# Patient Record
Sex: FEMALE | Race: WHITE | Employment: FULL TIME | URBAN - METROPOLITAN AREA
[De-identification: names, ages, dates, MRNs, and addresses within clinical notes are randomized per-mention and may not be internally consistent; named-entity substitution may affect disease eponyms.]

---

## 2022-06-29 ENCOUNTER — TRANSCRIBE ORDER (OUTPATIENT)
Dept: SCHEDULING | Age: 32
End: 2022-06-29

## 2022-06-29 DIAGNOSIS — N63.20 MASS OF LEFT BREAST: Primary | ICD-10-CM

## 2022-07-19 ENCOUNTER — HOSPITAL ENCOUNTER (OUTPATIENT)
Dept: MAMMOGRAPHY | Age: 32
Discharge: HOME | End: 2022-07-19
Attending: STUDENT IN AN ORGANIZED HEALTH CARE EDUCATION/TRAINING PROGRAM

## 2022-07-19 DIAGNOSIS — N63.20 MASS OF LEFT BREAST: ICD-10-CM

## 2022-10-03 ENCOUNTER — HOSPITAL ENCOUNTER (OUTPATIENT)
Dept: PERINATAL CARE | Age: 32
Discharge: HOME | End: 2022-10-03
Attending: OBSTETRICS & GYNECOLOGY

## 2022-10-04 NOTE — PROGRESS NOTES
MATERNAL FETAL MEDICINE      REQUESTED BY: Dr. Moses Gutierrez: 10/04/22    REASON FOR CONSULTATION: elevated anticardiolipin antibodies    Giuliana Sequeira is a 33 y.o. female      HPI  Wyatt Andrea had a full term pregnancy with no complications. She has tried to become pregnant and has had several miscarriages. Her first miscarriage was at approximately 10 weeks. She had seen a fetal heartbeat at a prior visit. Since then, she has had 3 miscarriages at approximately 6 weeks gestation. She has not had cytogenetics performed on products of conception. She had an evaluation for recurrent pregnancy loss several years ago. She is unsure of the findings. Wyatt Andrea had her most recent miscarriage in August. An evaluation after that miscarriage demonstrated an elevated anti-cardiolipin IgM. She reports that during her first pregnancy, she has a false positive RPR. She has not had a pelvic ultrasound performed. She reports that she has regular menses. No past medical history on file. Past Surgical History:   Procedure Laterality Date    HX APPENDECTOMY         OB History    Para Term  AB Living   5 1 1 0 4 1   SAB IAB Ectopic Molar Multiple Live Births   4 0 0 0 0 1      # Outcome Date GA Lbr Narayan/2nd Weight Sex Delivery Anes PTL Lv   5 Term 11   3.374 kg M Vag-Spont   BRYANNA   4 SAB  6w0d    SAB      3 SAB  6w0d    SAB      2 SAB  6w0d    SAB      1 SAB  10w0d    SAB        No known allergies    No current outpatient medications on file. Denies significant family history    Review of Systems   Constitutional: Negative. HENT: Negative. Eyes: Negative. Respiratory: Negative. Cardiovascular: Negative. Gastrointestinal: Negative. Genitourinary: Negative. Skin: Negative.       /72, HR 97, Wt 128 lb    Gen: Comfortable, NAD  Resp: Comfortable respirations  CV: Well perfused  Ext: Moving all extremities well  Neuro: Alert, interactive, appropriate    ASSESSMENT:    33 y.o. female  with recurrent pregnancy loss. Leatha Chery has an elevated anti-cardiolipin (ACL) IgM. We discussed that she likely has antiphospholipid antibody syndrome. To accurately diagnose APLAS, she needs two abnormal ACL values. But considering her history and her abnormal ACL, she highly likely has APLAS. I will repeat her acquired thrombophilia panel today. We discussed that APLAS is an acquired thrombophilia. Her prior testing my have been normal, but she can develop abnormal antibodies at any time. Treatment of APLAS includes anticoagulation with Lovenox 40 mg SQ daily along with aspirin 81 mg daily. We discussed that she can start Lovenox once she has a confirmed viable intrauterine pregnancy. We discussed other complications of APLAS including development of preeclampsia or fetal growth restriction. I also recommend a non-pregnant pelvic ultrasound to evaluate for fibroids, uterine polyps or other uterine abnormalities. These uterine issues can also contribute to miscarriage. PLAN:    With confirmed intrauterine pregnancy, begin Lovenox 40 mg SQ daily with ASA 81 mg daily  I will repeat lupus anticoagulant, anti-cardiolipin, and beta-2 glycoprotein labs today  I recommend a non-pregnant pelvic ultrasound, potential adding a sonohystogram  I look forward to caring for Leatha Chery when she is pregnant.     Alex Paulson MD  Maternal Fetal Medicine